# Patient Record
Sex: MALE | Race: WHITE | Employment: OTHER | URBAN - METROPOLITAN AREA
[De-identification: names, ages, dates, MRNs, and addresses within clinical notes are randomized per-mention and may not be internally consistent; named-entity substitution may affect disease eponyms.]

---

## 2018-04-09 ENCOUNTER — HOSPITAL ENCOUNTER (OUTPATIENT)
Dept: GENERAL RADIOLOGY | Age: 83
Discharge: HOME | End: 2018-04-09

## 2018-04-09 DIAGNOSIS — M25.551 RIGHT HIP PAIN: ICD-10-CM

## 2018-04-09 PROCEDURE — 72100 X-RAY EXAM L-S SPINE 2/3 VWS: CPT

## 2018-04-09 PROCEDURE — 73502 X-RAY EXAM HIP UNI 2-3 VIEWS: CPT

## 2018-04-17 ENCOUNTER — HOSPITAL ENCOUNTER (OUTPATIENT)
Dept: ULTRASOUND IMAGING | Age: 83
Discharge: HOME | End: 2018-04-17

## 2018-04-17 DIAGNOSIS — I71.40 ABDOMINAL AORTIC ANEURYSM WITHOUT RUPTURE: ICD-10-CM

## 2018-04-17 PROCEDURE — 76775 US EXAM ABDO BACK WALL LIM: CPT

## 2018-06-16 ENCOUNTER — HOSPITAL ENCOUNTER (OUTPATIENT)
Dept: MRI IMAGING | Age: 83
Discharge: HOME | End: 2018-06-16
Attending: ORTHOPAEDIC SURGERY
Payer: MEDICARE

## 2018-06-16 DIAGNOSIS — M54.16 LUMBAR RADICULOPATHY: ICD-10-CM

## 2018-06-16 PROCEDURE — 72148 MRI LUMBAR SPINE W/O DYE: CPT

## 2018-07-18 ENCOUNTER — HOSPITAL ENCOUNTER (OUTPATIENT)
Dept: INTERVENTIONAL RADIOLOGY/VASCULAR | Age: 83
Discharge: HOME | End: 2018-07-18
Attending: ORTHOPAEDIC SURGERY
Payer: MEDICARE

## 2018-07-18 VITALS
TEMPERATURE: 98.3 F | BODY MASS INDEX: 30.1 KG/M2 | HEIGHT: 71 IN | WEIGHT: 215 LBS | HEART RATE: 78 BPM | DIASTOLIC BLOOD PRESSURE: 77 MMHG | RESPIRATION RATE: 16 BRPM | OXYGEN SATURATION: 98 % | SYSTOLIC BLOOD PRESSURE: 170 MMHG

## 2018-07-18 DIAGNOSIS — M51.26 HERNIATED NUCLEUS PULPOSUS, LUMBAR: ICD-10-CM

## 2018-07-18 DIAGNOSIS — M48.061 FORAMINAL STENOSIS OF LUMBAR REGION: ICD-10-CM

## 2018-07-18 PROCEDURE — 74011636320 HC RX REV CODE- 636/320: Performed by: RADIOLOGY

## 2018-07-18 PROCEDURE — 74011000250 HC RX REV CODE- 250: Performed by: RADIOLOGY

## 2018-07-18 PROCEDURE — 74011250636 HC RX REV CODE- 250/636: Performed by: RADIOLOGY

## 2018-07-18 PROCEDURE — 64483 NJX AA&/STRD TFRM EPI L/S 1: CPT

## 2018-07-18 RX ORDER — DEXAMETHASONE SODIUM PHOSPHATE 10 MG/ML
10 INJECTION INTRAMUSCULAR; INTRAVENOUS ONCE
Status: COMPLETED | OUTPATIENT
Start: 2018-07-18 | End: 2018-07-18

## 2018-07-18 RX ORDER — LIDOCAINE HYDROCHLORIDE 10 MG/ML
5 INJECTION, SOLUTION EPIDURAL; INFILTRATION; INTRACAUDAL; PERINEURAL
Status: COMPLETED | OUTPATIENT
Start: 2018-07-18 | End: 2018-07-18

## 2018-07-18 RX ORDER — LIDOCAINE HYDROCHLORIDE 20 MG/ML
50 INJECTION, SOLUTION INFILTRATION; PERINEURAL
Status: COMPLETED | OUTPATIENT
Start: 2018-07-18 | End: 2018-07-18

## 2018-07-18 RX ADMIN — IOPAMIDOL 2 ML: 408 INJECTION, SOLUTION INTRATHECAL at 10:46

## 2018-07-18 RX ADMIN — LIDOCAINE HYDROCHLORIDE 2 ML: 10 INJECTION, SOLUTION EPIDURAL; INFILTRATION; INTRACAUDAL; PERINEURAL at 10:46

## 2018-07-18 RX ADMIN — SODIUM BICARBONATE 2 ML: 0.2 INJECTION, SOLUTION INTRAVENOUS at 10:47

## 2018-07-18 RX ADMIN — DEXAMETHASONE SODIUM PHOSPHATE 10 MG: 10 INJECTION, SOLUTION INTRAMUSCULAR; INTRAVENOUS at 10:46

## 2018-07-18 RX ADMIN — LIDOCAINE HYDROCHLORIDE 10 ML: 20 INJECTION, SOLUTION INFILTRATION; PERINEURAL at 10:46

## 2018-07-18 NOTE — DISCHARGE INSTRUCTIONS
[address] [facility]  Special Procedures/Radiology Department      Steroidal Injection      Go home and rest.    No vigorous physical activity today. Be aware that numbness and/or tingling can occur up to 24 hours after the injection. No driving today. Resume your previous diet. Resume your previous medications. Depending on your job, you may return to work in 25 to 48 hours. It may take up to one week after the injection to see a change or an improvement in your symptoms. Be sure to follow up with your physician. Tell your physician if the injection helped with your symptoms or if the injection did nothing for your symptoms. For minor discomfort, you can take Tylenol, as directed on the label.       If you have any questions or concerns, please call [phone] and ask for the nurse on-call

## 2018-07-18 NOTE — ROUTINE PROCESS
Discharge instructions have been reviewed with patient and present family. Copy given to patient. Pt verbalized understand of instructions and was given the opportunity to ask questions and receive answers prior to leaving. Pt discharged with family and assisted out by RN in wheelchair. In NAD at time of d/c.

## 2018-07-18 NOTE — IP AVS SNAPSHOT
Summary of Care Report The Summary of Care report has been created to help improve care coordination. Users with access to [employer] or [employer] [facility] (Web-based application) may access additional patient information including the Discharge Summary. If you are not currently a [address] user and need more information, please call the number listed below in the [place] [address] section and ask to be connected with Medical Records. Facility Information Name Address Phone [address] [address] [phone] Patient Information Patient Name Sex FRANCISCO Hilliard ([id number]) Male 1935 Discharge Information Admitting Provider Service Area Unit  
 (none) [address] [place] [address] Janie Dakin / [phone] Discharge Provider Discharge Date/Time Discharge Disposition Destination (none) (none) (none) (none) Patient Language Language ENGLISH [13] Hospital Problems as of 2018  Reviewed: 2013  9:42 AM by Maria Fernanda Saleh None Non-Hospital Problems as of 2018  Reviewed: 2013  9:42 AM by Maria Fernanda Saleh Class Noted - Resolved Last Modified Active Problems Unstable angina ([facility] [address]  2013 - Present 2013 Entered by Maria Fernanda Saleh S/P PTCA (percutaneous transluminal coronary angioplasty)  2013 - Present 2013 Entered by Maria Fernanda Saleh You are allergic to the following Allergen Reactions Codeine Nausea and Vomiting Omeprazole Unknown (comments) Pt cant remember reaction Rofecoxib Unable to Obtain Tamsulosin Unable to Obtain Vardenafil Unable to Obtain Current Discharge Medication List  
  
ASK your doctor about these medications Dose & Instructions Dispensing Information Comments aspirin 325 mg tablet Commonly known as:  ASPIRIN Dose:  325 mg Take 325 mg by mouth daily. Refills:  0  
   
 clopidogrel 75 mg Tab Commonly known as:  PLAVIX Dose:  75 mg Take 1 Tab by mouth daily. Quantity:  30 Tab Refills:  12  
   
 fluticasone 27.5 mcg/actuation nasal spray Commonly known as:  VERAMYST Dose:  2 Spray 2 Sprays by Both Nostrils route as needed for Rhinitis. Refills:  0 NexIUM 40 mg capsule Generic drug:  esomeprazole Dose:  40 mg Take 40 mg by mouth daily. Refills:  0  
   
 pravastatin 10 mg tablet Commonly known as:  PRAVACHOL Dose:  10 mg Take 1 Tab by mouth nightly. Quantity:  30 Tab Refills:  12 PROAIR HFA 90 mcg/actuation inhaler Generic drug:  albuterol Dose:  2 Puff Take 2 Puffs by inhalation every four (4) hours as needed for Wheezing or Shortness of Breath. Refills:  0 Follow-up Information None Discharge Instructions [facility] [place]iology [place] Steroidal Injection Go home and rest. 
 
No vigorous physical activity today. Be aware that numbness and/or tingling can occur up to 24 hours after the injection. No driving today. Resume your previous diet. Resume your previous medications. Depending on your job, you may return to work in 25 to 48 hours. It may take up to one week after the injection to see a change or an improvement in your symptoms. Be sure to follow up with your physician. Tell your physician if the injection helped with your symptoms or if the injection did nothing for your symptoms. For minor discomfort, you can take Tylenol, as directed on the label. If you have any questions or concerns, please call [phone] and ask for the nurse on-call Chart Review Routing History No Routing History on File

## 2019-10-09 ENCOUNTER — HOSPITAL ENCOUNTER (OUTPATIENT)
Dept: GENERAL RADIOLOGY | Age: 84
Discharge: HOME | End: 2019-10-09
Payer: MEDICARE

## 2019-10-09 DIAGNOSIS — J45.41 MODERATE PERSISTENT REACTIVE AIRWAY DISEASE WITH ACUTE EXACERBATION: ICD-10-CM

## 2019-10-09 PROCEDURE — 71046 X-RAY EXAM CHEST 2 VIEWS: CPT

## 2021-04-18 ENCOUNTER — APPOINTMENT (OUTPATIENT)
Dept: GENERAL RADIOLOGY | Age: 86
End: 2021-04-18
Attending: EMERGENCY MEDICINE
Payer: MEDICARE

## 2021-04-18 ENCOUNTER — HOSPITAL ENCOUNTER (EMERGENCY)
Age: 86
Discharge: HOME | End: 2021-04-18
Attending: EMERGENCY MEDICINE
Payer: MEDICARE

## 2021-04-18 VITALS
RESPIRATION RATE: 22 BRPM | WEIGHT: 240.74 LBS | HEIGHT: 69 IN | TEMPERATURE: 98.8 F | OXYGEN SATURATION: 92 % | DIASTOLIC BLOOD PRESSURE: 78 MMHG | BODY MASS INDEX: 35.66 KG/M2 | HEART RATE: 98 BPM | SYSTOLIC BLOOD PRESSURE: 140 MMHG

## 2021-04-18 DIAGNOSIS — R06.02 SHORTNESS OF BREATH: ICD-10-CM

## 2021-04-18 DIAGNOSIS — R07.89 CHEST DISCOMFORT: ICD-10-CM

## 2021-04-18 DIAGNOSIS — K21.00 GASTROESOPHAGEAL REFLUX DISEASE WITH ESOPHAGITIS WITHOUT HEMORRHAGE: ICD-10-CM

## 2021-04-18 DIAGNOSIS — J45.21 MILD INTERMITTENT ASTHMA WITH ACUTE EXACERBATION: Primary | ICD-10-CM

## 2021-04-18 LAB
ALBUMIN SERPL-MCNC: 3.8 G/DL (ref 3.5–5)
ALBUMIN/GLOB SERPL: 1 {RATIO} (ref 1.1–2.2)
ALP SERPL-CCNC: 77 U/L (ref 45–117)
ALT SERPL-CCNC: 17 U/L (ref 12–78)
ANION GAP SERPL CALC-SCNC: 4 MMOL/L (ref 5–15)
AST SERPL-CCNC: 10 U/L (ref 15–37)
ATRIAL RATE: 97 BPM
BASOPHILS # BLD: 0 K/UL (ref 0–0.1)
BASOPHILS NFR BLD: 0 % (ref 0–1)
BILIRUB SERPL-MCNC: 1 MG/DL (ref 0.2–1)
BNP SERPL-MCNC: 187 PG/ML
BUN SERPL-MCNC: 9 MG/DL (ref 6–20)
BUN/CREAT SERPL: 8 (ref 12–20)
CALCIUM SERPL-MCNC: 8.9 MG/DL (ref 8.5–10.1)
CALCULATED P AXIS, ECG09: 46 DEGREES
CALCULATED R AXIS, ECG10: 80 DEGREES
CALCULATED T AXIS, ECG11: 54 DEGREES
CHLORIDE SERPL-SCNC: 105 MMOL/L (ref 97–108)
CO2 SERPL-SCNC: 28 MMOL/L (ref 21–32)
CREAT SERPL-MCNC: 1.14 MG/DL (ref 0.7–1.3)
DIAGNOSIS, 93000: NORMAL
DIFFERENTIAL METHOD BLD: ABNORMAL
EOSINOPHIL # BLD: 0.1 K/UL (ref 0–0.4)
EOSINOPHIL NFR BLD: 1 % (ref 0–7)
ERYTHROCYTE [DISTWIDTH] IN BLOOD BY AUTOMATED COUNT: 13.1 % (ref 11.5–14.5)
GLOBULIN SER CALC-MCNC: 3.7 G/DL (ref 2–4)
GLUCOSE SERPL-MCNC: 108 MG/DL (ref 65–100)
HCT VFR BLD AUTO: 39.6 % (ref 36.6–50.3)
HGB BLD-MCNC: 13.3 G/DL (ref 12.1–17)
IMM GRANULOCYTES # BLD AUTO: 0.1 K/UL (ref 0–0.04)
IMM GRANULOCYTES NFR BLD AUTO: 1 % (ref 0–0.5)
LYMPHOCYTES # BLD: 1 K/UL (ref 0.8–3.5)
LYMPHOCYTES NFR BLD: 8 % (ref 12–49)
MCH RBC QN AUTO: 31.3 PG (ref 26–34)
MCHC RBC AUTO-ENTMCNC: 33.6 G/DL (ref 30–36.5)
MCV RBC AUTO: 93.2 FL (ref 80–99)
MONOCYTES # BLD: 1.2 K/UL (ref 0–1)
MONOCYTES NFR BLD: 10 % (ref 5–13)
NEUTS SEG # BLD: 9.7 K/UL (ref 1.8–8)
NEUTS SEG NFR BLD: 80 % (ref 32–75)
NRBC # BLD: 0 K/UL (ref 0–0.01)
NRBC BLD-RTO: 0 PER 100 WBC
P-R INTERVAL, ECG05: 142 MS
PLATELET # BLD AUTO: 181 K/UL (ref 150–400)
PMV BLD AUTO: 9.7 FL (ref 8.9–12.9)
POTASSIUM SERPL-SCNC: 3.8 MMOL/L (ref 3.5–5.1)
PROT SERPL-MCNC: 7.5 G/DL (ref 6.4–8.2)
Q-T INTERVAL, ECG07: 372 MS
QRS DURATION, ECG06: 110 MS
QTC CALCULATION (BEZET), ECG08: 472 MS
RBC # BLD AUTO: 4.25 M/UL (ref 4.1–5.7)
SODIUM SERPL-SCNC: 137 MMOL/L (ref 136–145)
TROPONIN I SERPL-MCNC: <0.05 NG/ML
TROPONIN I SERPL-MCNC: <0.05 NG/ML
VENTRICULAR RATE, ECG03: 97 BPM
WBC # BLD AUTO: 12.2 K/UL (ref 4.1–11.1)

## 2021-04-18 PROCEDURE — 74011000250 HC RX REV CODE- 250: Performed by: EMERGENCY MEDICINE

## 2021-04-18 PROCEDURE — 80053 COMPREHEN METABOLIC PANEL: CPT

## 2021-04-18 PROCEDURE — 84484 ASSAY OF TROPONIN QUANT: CPT

## 2021-04-18 PROCEDURE — 96375 TX/PRO/DX INJ NEW DRUG ADDON: CPT

## 2021-04-18 PROCEDURE — 96374 THER/PROPH/DIAG INJ IV PUSH: CPT

## 2021-04-18 PROCEDURE — 36415 COLL VENOUS BLD VENIPUNCTURE: CPT

## 2021-04-18 PROCEDURE — 93005 ELECTROCARDIOGRAM TRACING: CPT

## 2021-04-18 PROCEDURE — 74011250636 HC RX REV CODE- 250/636: Performed by: EMERGENCY MEDICINE

## 2021-04-18 PROCEDURE — 85025 COMPLETE CBC W/AUTO DIFF WBC: CPT

## 2021-04-18 PROCEDURE — 83880 ASSAY OF NATRIURETIC PEPTIDE: CPT

## 2021-04-18 PROCEDURE — 99285 EMERGENCY DEPT VISIT HI MDM: CPT

## 2021-04-18 PROCEDURE — 77030029684 HC NEB SM VOL KT MONA -A

## 2021-04-18 PROCEDURE — 74011250637 HC RX REV CODE- 250/637: Performed by: EMERGENCY MEDICINE

## 2021-04-18 PROCEDURE — 94640 AIRWAY INHALATION TREATMENT: CPT

## 2021-04-18 PROCEDURE — 71045 X-RAY EXAM CHEST 1 VIEW: CPT

## 2021-04-18 RX ORDER — PREDNISONE 10 MG/1
TABLET ORAL
Qty: 21 TAB | Refills: 0 | Status: SHIPPED | OUTPATIENT
Start: 2021-04-18

## 2021-04-18 RX ORDER — ALBUTEROL SULFATE 90 UG/1
2 AEROSOL, METERED RESPIRATORY (INHALATION)
Qty: 1 INHALER | Refills: 0 | Status: SHIPPED | OUTPATIENT
Start: 2021-04-18

## 2021-04-18 RX ORDER — DEXAMETHASONE SODIUM PHOSPHATE 4 MG/ML
6 INJECTION, SOLUTION INTRA-ARTICULAR; INTRALESIONAL; INTRAMUSCULAR; INTRAVENOUS; SOFT TISSUE
Status: COMPLETED | OUTPATIENT
Start: 2021-04-18 | End: 2021-04-18

## 2021-04-18 RX ORDER — IPRATROPIUM BROMIDE AND ALBUTEROL SULFATE 2.5; .5 MG/3ML; MG/3ML
3 SOLUTION RESPIRATORY (INHALATION)
Status: COMPLETED | OUTPATIENT
Start: 2021-04-18 | End: 2021-04-18

## 2021-04-18 RX ADMIN — FAMOTIDINE 20 MG: 10 INJECTION, SOLUTION INTRAVENOUS at 07:47

## 2021-04-18 RX ADMIN — ALUMINUM HYDROXIDE AND MAGNESIUM HYDROXIDE 30 ML: 200; 200 SUSPENSION ORAL at 07:06

## 2021-04-18 RX ADMIN — DEXAMETHASONE SODIUM PHOSPHATE 6 MG: 4 INJECTION, SOLUTION INTRAMUSCULAR; INTRAVENOUS at 07:07

## 2021-04-18 RX ADMIN — IPRATROPIUM BROMIDE AND ALBUTEROL SULFATE 3 ML: .5; 3 SOLUTION RESPIRATORY (INHALATION) at 07:07

## 2021-04-18 NOTE — DISCHARGE INSTRUCTIONS
You may take over-the-counter Maalox and/or Pepcid as needed for acid reflux and burning in the chest.  Continue to use your breathing treatments and inhaler at home at least every 4 hours. If you have to use it more often than every 1-2 hours, return to the emergency department. It was a pleasure taking care of you in our Emergency Department today. We know that when you come to [facility], you are entrusting us with your health, comfort, and safety. Our physicians and nurses honor that trust, and truly appreciate the opportunity to care for you and your loved ones. We also value your feedback. If you receive a survey about your Emergency Department experience today, please fill it out. We care about our patients' feedback, and we listen to what you have to say. Please read over your discharge instructions as these contain pertinent information to help you in the healing process. These instructions include a list of prescriptions you were given today. Follow-up information is also noted on your discharge papers. There are attached instructions and information pertaining to the reason why you were seen in the emergency department today. These discharge instructions may not be for exactly why you were here, but may be the closest available instructions that we have. These include important advice for things that you can do at home to feel better, and reasons to return to the emergency department. The evaluation and treatment you received in the emergency department is not always definitive care. If follow-up with your primary care doctor or specialist was recommended, it is important that you make these appointments for follow-up care. You may need further testing, procedures, and/or medications to help you feel better. Further tests may be required that are not available in the emergency department.   Failure to make these follow-up appointments may jeopardize your health. The emergency department is here for emergent stabilization and evaluation of life and limb threatening illness and/or injuries. Further care through a specialist or primary care doctor may be required to assist in your healing and complete your treatment and/or evaluation. We may not always be able to make a diagnosis in the emergency department, or things may change that will alter your diagnosis. Our primary goal is to ensure that nothing serious is occurring and that you are stable to continue your treatment and evaluation at home as an outpatient. Of course, if things change, and you feel worse, you are always encouraged to return to the emergency department for re-evaluation. Lab Results Today:  Recent Results (from the past 8 hour(s))   EKG, 12 LEAD, INITIAL    Collection Time: 04/18/21  4:43 AM   Result Value Ref Range    Ventricular Rate 97 BPM    Atrial Rate 97 BPM    P-R Interval 142 ms    QRS Duration 110 ms    Q-T Interval 372 ms    QTC Calculation (Bezet) 472 ms    Calculated P Axis 46 degrees    Calculated R Axis 80 degrees    Calculated T Axis 54 degrees    Diagnosis       Normal sinus rhythm  Incomplete right bundle branch block  Nonspecific ST and T wave abnormality  When compared with ECG of 08-NOV-2013 03:36,  Vent. rate has increased BY  32 BPM  Incomplete right bundle branch block is now present     CBC WITH AUTOMATED DIFF    Collection Time: 04/18/21  4:50 AM   Result Value Ref Range    WBC 12.2 (H) 4.1 - 11.1 K/uL    RBC 4.25 4. 10 - 5.70 M/uL    HGB 13.3 12.1 - 17.0 g/dL    HCT 39.6 36.6 - 50.3 %    MCV 93.2 80.0 - 99.0 FL    MCH 31.3 26.0 - 34.0 PG    MCHC 33.6 30.0 - 36.5 g/dL    RDW 13.1 11.5 - 14.5 %    PLATELET 456 149 - 812 K/uL    MPV 9.7 8.9 - 12.9 FL    NRBC 0.0 0  WBC    ABSOLUTE NRBC 0.00 0.00 - 0.01 K/uL    NEUTROPHILS 80 (H) 32 - 75 %    LYMPHOCYTES 8 (L) 12 - 49 %    MONOCYTES 10 5 - 13 %    EOSINOPHILS 1 0 - 7 %    BASOPHILS 0 0 - 1 % IMMATURE GRANULOCYTES 1 (H) 0.0 - 0.5 %    ABS. NEUTROPHILS 9.7 (H) 1.8 - 8.0 K/UL    ABS. LYMPHOCYTES 1.0 0.8 - 3.5 K/UL    ABS. MONOCYTES 1.2 (H) 0.0 - 1.0 K/UL    ABS. EOSINOPHILS 0.1 0.0 - 0.4 K/UL    ABS. BASOPHILS 0.0 0.0 - 0.1 K/UL    ABS. IMM. GRANS. 0.1 (H) 0.00 - 0.04 K/UL    DF AUTOMATED     METABOLIC PANEL, COMPREHENSIVE    Collection Time: 04/18/21  4:50 AM   Result Value Ref Range    Sodium 137 136 - 145 mmol/L    Potassium 3.8 3.5 - 5.1 mmol/L    Chloride 105 97 - 108 mmol/L    CO2 28 21 - 32 mmol/L    Anion gap 4 (L) 5 - 15 mmol/L    Glucose 108 (H) 65 - 100 mg/dL    BUN 9 6 - 20 MG/DL    Creatinine 1.14 0.70 - 1.30 MG/DL    BUN/Creatinine ratio 8 (L) 12 - 20      GFR est AA >60 >60 ml/min/1.73m2    GFR est non-AA >60 >60 ml/min/1.73m2    Calcium 8.9 8.5 - 10.1 MG/DL    Bilirubin, total 1.0 0.2 - 1.0 MG/DL    ALT (SGPT) 17 12 - 78 U/L    AST (SGOT) 10 (L) 15 - 37 U/L    Alk. phosphatase 77 45 - 117 U/L    Protein, total 7.5 6.4 - 8.2 g/dL    Albumin 3.8 3.5 - 5.0 g/dL    Globulin 3.7 2.0 - 4.0 g/dL    A-G Ratio 1.0 (L) 1.1 - 2.2     TROPONIN I    Collection Time: 04/18/21  4:50 AM   Result Value Ref Range    Troponin-I, Qt. <0.05 <0.05 ng/mL   NT-PRO BNP    Collection Time: 04/18/21  7:20 AM   Result Value Ref Range    NT pro- <450 PG/ML   TROPONIN I    Collection Time: 04/18/21  7:20 AM   Result Value Ref Range    Troponin-I, Qt. <0.05 <0.05 ng/mL        Radiology Results Today:  Xr Chest Port    Result Date: 4/18/2021  No Acute Disease.

## 2021-04-18 NOTE — ED PROVIDER NOTES
EMERGENCY DEPARTMENT HISTORY AND PHYSICAL EXAM      Date: 4/18/2021  Patient Name: Monte Sacks    History of Presenting Illness     Chief Complaint   Patient presents with    Chest Pain     Patient ambulatory to triage acute onset last night.  Shortness of Breath       History Provided By: Patient and Patient's Son    HPI: Monte Sacks, 82 y.o. male  presents to the ED with cc of chest discomfort and shortness of breath. Patient states he woke up at about midnight with shortness of breath and burning sensation in his chest.  No radiation of the discomfort. No cough. He has not any fevers or chills. No nausea or vomiting. He states he has been very fatigued recently. He does have a history of asthma and used an inhaler which she states did make a big difference at home and did help improve his shortness of breath. He does have a cardiac history but is currently not on any antiplatelets. No leg swelling or edema. He does not describe any orthopnea. Past History     Past Medical History:  Past Medical History:   Diagnosis Date    Arthritis     Cancer ([facility] [address]     skin CA    GERD (gastroesophageal reflux disease)     Other and unspecified symptoms and signs involving general sensations and perceptions     DJD    Other ill-defined conditions(799.89)     Hiatal Hernia       Past Surgical History:  Past Surgical History:   Procedure Laterality Date    ABDOMEN SURGERY PROC UNLISTED      Gallbladder removed    HX ORTHOPAEDIC      L.Knee Arthoscopic surgery       Medications:  No current facility-administered medications on file prior to encounter. Current Outpatient Medications on File Prior to Encounter   Medication Sig Dispense Refill    pravastatin (PRAVACHOL) 10 mg tablet Take 1 Tab by mouth nightly. 30 Tab 12    [DISCONTINUED] clopidogrel (PLAVIX) 75 mg tablet Take 1 Tab by mouth daily.  30 Tab 12    fluticasone (VERAMYST) 27.5 mcg/actuation nasal spray 2 Sprays by Both Nostrils route as needed for Rhinitis.  esomeprazole (NEXIUM) 40 mg capsule Take 40 mg by mouth daily.  [DISCONTINUED] aspirin (ASPIRIN) 325 mg tablet Take 325 mg by mouth daily.  [DISCONTINUED] albuterol (PROAIR HFA) 90 mcg/actuation inhaler Take 2 Puffs by inhalation every four (4) hours as needed for Wheezing or Shortness of Breath. Family History:  Family History   Problem Relation Age of Onset    Heart Disease Mother     Diabetes Mother     Heart Disease Father     Cancer Sister     Cancer Brother     Diabetes Brother        Social History:  Social History     Tobacco Use    Smoking status: Former Smoker     Quit date: 1980     Years since quittin.4   Substance Use Topics    Alcohol use: No    Drug use: Not on file       Allergies: Allergies   Allergen Reactions    Codeine Nausea and Vomiting    Omeprazole Unknown (comments)     Pt cant remember reaction    Rofecoxib Unable to Obtain    Tamsulosin Unable to Obtain    Vardenafil Unable to Obtain       All the above components of the past  history are auto-populated from the electronic record. They have been reviewed and the patient has been interviewed for any pertinent past history that pertains to the patient's chief complaint and reason for visit. Not all pre-populated components may be accurate at the time this note was generated. Review of Systems   Review of Systems   Constitutional: Positive for fatigue. Negative for chills and fever. HENT: Negative for congestion, ear pain, rhinorrhea, sore throat and trouble swallowing. Eyes: Negative for visual disturbance. Respiratory: Positive for shortness of breath. Negative for cough and chest tightness. Cardiovascular: Positive for chest pain (\"burning\"). Negative for palpitations. Gastrointestinal: Negative for abdominal pain, blood in stool, constipation, diarrhea, nausea and vomiting.    Genitourinary: Negative for decreased urine volume, difficulty urinating, dysuria and frequency. Musculoskeletal: Negative for back pain and neck pain. Skin: Negative for color change and rash. Neurological: Negative for dizziness, weakness, light-headedness and headaches. Physical Exam   Physical Exam  Vitals signs and nursing note reviewed. Constitutional:       General: He is not in acute distress. Appearance: He is well-developed. He is not ill-appearing. HENT:      Head: Normocephalic. Eyes:      Conjunctiva/sclera: Conjunctivae normal.   Neck:      Musculoskeletal: Normal range of motion. Cardiovascular:      Rate and Rhythm: Normal rate and regular rhythm. Pulmonary:      Effort: Pulmonary effort is normal. No accessory muscle usage or respiratory distress. Abdominal:      General: There is no distension. Skin:     General: Skin is warm and dry. Neurological:      Mental Status: He is alert and oriented to person, place, and time. Due to the COVID-19 pandemic, in order to reduce the spread and transmission of the virus, some basic elements of the physical exam have been deferred to reduce direct or close contact with the patient unless they are deemed to be absolutely necessary, regardless of whether the virus is highly suspected or not. Diagnostic Study Results     Labs -     Recent Results (from the past 24 hour(s))   EKG, 12 LEAD, INITIAL    Collection Time: 04/18/21  4:43 AM   Result Value Ref Range    Ventricular Rate 97 BPM    Atrial Rate 97 BPM    P-R Interval 142 ms    QRS Duration 110 ms    Q-T Interval 372 ms    QTC Calculation (Bezet) 472 ms    Calculated P Axis 46 degrees    Calculated R Axis 80 degrees    Calculated T Axis 54 degrees    Diagnosis       Normal sinus rhythm  Incomplete right bundle branch block  Nonspecific ST and T wave abnormality  When compared with ECG of 08-NOV-2013 03:36,  Vent.  rate has increased BY  32 BPM  Incomplete right bundle branch block is now present     CBC WITH AUTOMATED DIFF    Collection Time: 04/18/21  4:50 AM   Result Value Ref Range    WBC 12.2 (H) 4.1 - 11.1 K/uL    RBC 4.25 4. 10 - 5.70 M/uL    HGB 13.3 12.1 - 17.0 g/dL    HCT 39.6 36.6 - 50.3 %    MCV 93.2 80.0 - 99.0 FL    MCH 31.3 26.0 - 34.0 PG    MCHC 33.6 30.0 - 36.5 g/dL    RDW 13.1 11.5 - 14.5 %    PLATELET 091 074 - 269 K/uL    MPV 9.7 8.9 - 12.9 FL    NRBC 0.0 0  WBC    ABSOLUTE NRBC 0.00 0.00 - 0.01 K/uL    NEUTROPHILS 80 (H) 32 - 75 %    LYMPHOCYTES 8 (L) 12 - 49 %    MONOCYTES 10 5 - 13 %    EOSINOPHILS 1 0 - 7 %    BASOPHILS 0 0 - 1 %    IMMATURE GRANULOCYTES 1 (H) 0.0 - 0.5 %    ABS. NEUTROPHILS 9.7 (H) 1.8 - 8.0 K/UL    ABS. LYMPHOCYTES 1.0 0.8 - 3.5 K/UL    ABS. MONOCYTES 1.2 (H) 0.0 - 1.0 K/UL    ABS. EOSINOPHILS 0.1 0.0 - 0.4 K/UL    ABS. BASOPHILS 0.0 0.0 - 0.1 K/UL    ABS. IMM. GRANS. 0.1 (H) 0.00 - 0.04 K/UL    DF AUTOMATED     METABOLIC PANEL, COMPREHENSIVE    Collection Time: 04/18/21  4:50 AM   Result Value Ref Range    Sodium 137 136 - 145 mmol/L    Potassium 3.8 3.5 - 5.1 mmol/L    Chloride 105 97 - 108 mmol/L    CO2 28 21 - 32 mmol/L    Anion gap 4 (L) 5 - 15 mmol/L    Glucose 108 (H) 65 - 100 mg/dL    BUN 9 6 - 20 MG/DL    Creatinine 1.14 0.70 - 1.30 MG/DL    BUN/Creatinine ratio 8 (L) 12 - 20      GFR est AA >60 >60 ml/min/1.73m2    GFR est non-AA >60 >60 ml/min/1.73m2    Calcium 8.9 8.5 - 10.1 MG/DL    Bilirubin, total 1.0 0.2 - 1.0 MG/DL    ALT (SGPT) 17 12 - 78 U/L    AST (SGOT) 10 (L) 15 - 37 U/L    Alk.  phosphatase 77 45 - 117 U/L    Protein, total 7.5 6.4 - 8.2 g/dL    Albumin 3.8 3.5 - 5.0 g/dL    Globulin 3.7 2.0 - 4.0 g/dL    A-G Ratio 1.0 (L) 1.1 - 2.2     TROPONIN I    Collection Time: 04/18/21  4:50 AM   Result Value Ref Range    Troponin-I, Qt. <0.05 <0.05 ng/mL   NT-PRO BNP    Collection Time: 04/18/21  7:20 AM   Result Value Ref Range    NT pro- <450 PG/ML   TROPONIN I    Collection Time: 04/18/21  7:20 AM   Result Value Ref Range    Troponin-I, Qt. <0.05 <0.05 ng/mL       Radiologic Studies -   XR CHEST PORT   Final Result   No Acute Disease. CT Results  (Last 48 hours)    None        CXR Results  (Last 48 hours)               04/18/21 0535  XR CHEST PORT Final result    Impression:  No Acute Disease. Narrative:  EXAM: Portable CXR. 0518 hours. INDICATION: shortness of breath       FINDINGS:   The lungs appear clear. Heart is normal in size. There is no pulmonary edema. There is no evident pneumothorax or pleural effusion. Medical Decision Making     I reviewed the vital signs, available nursing notes, past medical history, past surgical history, family history and social history. Vital Signs-I have reviewed the vital signs that have been made available during the patient's emergency department visit. The vital signs auto-populated below are obtained mostly by electronic means through monitoring devices that have been downloaded into the patient's chart by the nursing staff. Some vital signs are not downloaded into the chart until after the patient has been discharged and this note has been completed, therefore some vital signs may not be available to the physician for review prior to patient's discharge or admission. The physician has reviewed the patient's triage vital signs, monitored the electronic monitoring devices remotely for any significant vital sign abnormalities, and have reviewed vital signs prior to discharge. Some vital signs reviewed at bedside or remotely utilizing electronic monitoring devices may be different than the vital signs downloaded into the electronic medical record. Some vital signs may be erroneous and inaccurate since they are obtained by electronic monitoring devices, and not all vital signs are verified for accuracy by nursing staff prior to downloading into the patient's chart.   Patient Vitals for the past 24 hrs:   Temp Pulse Resp BP SpO2   04/18/21 0830  98 22 (!) 140/78 92 %   04/18/21 0745  98 24 120/70 93 %   04/18/21 0700  97 21 139/78 91 %   04/18/21 0600  96 19 122/74 94 %   04/18/21 0530  97 26 128/66    04/18/21 0522     96 %   04/18/21 0440 98.8 °F (37.1 °C) (!) 105 20  97 %         Records Reviewed: Nursing notes for today's visit have been reviewed. I have also reviewed most recent medical records pertinent to today's complaints, if available in our medical record system. I have also reviewed all labs and imaging results from previous results in comparison to results obtained today. If an EKG was obtained today, it has been compared to previous EKGs, if available. If arriving via EMS, the EMS report has been reviewed if made available to us within the patient's time in the emergency department. Provider Notes (Medical Decision Making):   Patient with a history of asthma presents with shortness of breath. His shortness of breath is relieved with bronchodilators. Chest x-ray does not show any evidence of edema or pneumonia. His troponin and BNP levels are within normal limits. He does have a history of heart disease. His is recent EKG in the system was in 2013 and there have been some minor changes but correlation with the patient's presentation today and lab findings do not have a strong suspicion for acute coronary syndrome. I think his symptoms are more consistent with asthma exacerbation and the burning in his chest is likely related to reflux. He is currently on Nexium daily. Advised that he may take Pepcid and Maalox over-the-counter. Recommend a short course of steroids and continued use of bronchodilators at home. ED Course:   Initial assessment performed. The patients presenting problems have been discussed, and they are in agreement with the care plan formulated and outlined with them. I have encouraged them to ask questions as they arise throughout their visit.          Orders Placed This Encounter    XR CHEST PORT    CBC WITH AUTOMATED DIFF    METABOLIC PANEL, COMPREHENSIVE    TROPONIN I    PRO-BNP    TROPONIN I    EKG NOTEWRITER(ASAP ONLY)    EKG 12 LEAD INITIAL    INSERT PERIPHERAL IV ONE TIME STAT    albuterol-ipratropium (DUO-NEB) 2.5 MG-0.5 MG/3 ML    dexamethasone (DECADRON) 4 mg/mL injection 6 mg    DISCONTD: famotidine (PF) (PEPCID) 20 mg in 0.9% sodium chloride 10 mL injection    aluminum-magnesium hydroxide (MAALOX) oral suspension 30 mL    famotidine (PF) (PEPCID) 20 mg in 0.9% sodium chloride 10 mL injection    albuterol (ProAir HFA) 90 mcg/actuation inhaler    predniSONE (STERAPRED DS) 10 mg dose pack       EKG    Date/Time: 4/18/2021 4:43 AM  Performed by: Romaine Ventura MD  Authorized by: Romaine Ventura MD     ECG reviewed by ED Physician in the absence of a cardiologist: yes    Rate:     ECG rate:  97    ECG rate assessment: normal    Rhythm:     Rhythm: sinus rhythm    Ectopy:     Ectopy: none    Conduction:     Conduction: abnormal      Abnormal conduction: incomplete RBBB    ST segments:     ST segments:  Non-specific  T waves:     T waves: non-specific            Critical Care Time:   0    Disposition:  Discharge    The patient's emergency department evaluation is now complete. I have reviewed all labs, imaging, and pertinent information. I have discussed all results with the patient and/or family. Based on our evaluation today I do believe that the patient is safe to be discharged home. The patient has been provided with at home instructions that are pertinent to their complaint today, although these may not be specific to the exact diagnosis. I have reviewed the patient's home medications and attempted to reconcile if not already done so by pharmacy or nursing staff. I have discussed all new prescriptions with the patient. The patient has been encouraged to follow-up with primary care doctor and/or specialist, and these have been discussed with the patient.   The patient has been advised that they may return to the emergency department if they have any worsening symptoms and or new symptoms that are of concern to them. Verbal discharge instructions may have also been provided to the patient that may not be specifically contained in the written discharge instructions. The patient has been given opportunity to ask questions prior to discharge. PLAN:  1. Current Discharge Medication List      START taking these medications    Details   predniSONE (STERAPRED DS) 10 mg dose pack 6-day Dosepak use as directed  Qty: 21 Tab, Refills: 0         CONTINUE these medications which have CHANGED    Details   albuterol (ProAir HFA) 90 mcg/actuation inhaler Take 2 Puffs by inhalation every four (4) hours as needed for Wheezing or Shortness of Breath. Qty: 1 Inhaler, Refills: 0         STOP taking these medications       clopidogrel (PLAVIX) 75 mg tablet Comments:   Reason for Stopping:         aspirin (ASPIRIN) 325 mg tablet Comments:   Reason for Stoppin.   Follow-up Information     Follow up With Specialties Details Why Contact Info    Michael Patrick MD Family Medicine Schedule an appointment as soon as possible for a visit in 2 days  [address] [place] [address]  [phone]          Return to ED if worse     Diagnosis     Clinical Impression:   1. Mild intermittent asthma with acute exacerbation    2. Gastroesophageal reflux disease with esophagitis without hemorrhage    3. Chest discomfort    4.  Shortness of breath

## 2021-04-18 NOTE — ED NOTES
Pt states he began having a burning sensation in his chest last night approx 7pm. States he constantly has SOB due to his asthma. Denies N/V/D or any other pain. Breathing even and unlabored; no apparent signs of distress. 5240 - Report given to Tho Calhoun RN. They were informed of patient chief complaint, current status, orders completed (to include IV access/medications/radiology testing), outstanding orders that still need to be completed, and the treatment plan. Ensured no questions or concerns regarding the patient prior to departure.

## 2023-05-14 RX ORDER — ESOMEPRAZOLE MAGNESIUM 40 MG/1
40 CAPSULE, DELAYED RELEASE ORAL DAILY
COMMUNITY

## 2023-05-14 RX ORDER — PREDNISONE 10 MG/1
TABLET ORAL
COMMUNITY
Start: 2021-04-18

## 2023-05-14 RX ORDER — ALBUTEROL SULFATE 90 UG/1
2 AEROSOL, METERED RESPIRATORY (INHALATION) EVERY 4 HOURS PRN
COMMUNITY
Start: 2021-04-18

## 2023-05-14 RX ORDER — PRAVASTATIN SODIUM 10 MG
TABLET ORAL
COMMUNITY
Start: 2013-11-08